# Patient Record
Sex: FEMALE | Race: WHITE | NOT HISPANIC OR LATINO | URBAN - METROPOLITAN AREA
[De-identification: names, ages, dates, MRNs, and addresses within clinical notes are randomized per-mention and may not be internally consistent; named-entity substitution may affect disease eponyms.]

---

## 2018-12-06 ENCOUNTER — HOSPITAL ENCOUNTER (EMERGENCY)
Facility: HOSPITAL | Age: 33
Discharge: PSYCHIATRIC OR SUBSTANCE USE FACILITY | End: 2018-12-06
Attending: EMERGENCY MEDICINE
Payer: COMMERCIAL

## 2018-12-06 VITALS
OXYGEN SATURATION: 99 % | BODY MASS INDEX: 53.81 KG/M2 | SYSTOLIC BLOOD PRESSURE: 132 MMHG | RESPIRATION RATE: 18 BRPM | WEIGHT: 285 LBS | DIASTOLIC BLOOD PRESSURE: 69 MMHG | HEIGHT: 61 IN | HEART RATE: 81 BPM | TEMPERATURE: 99 F

## 2018-12-06 DIAGNOSIS — T50.902A INTENTIONAL DRUG OVERDOSE, INITIAL ENCOUNTER: ICD-10-CM

## 2018-12-06 DIAGNOSIS — R45.851 SUICIDAL THOUGHTS: ICD-10-CM

## 2018-12-06 DIAGNOSIS — F32.2 SEVERE DEPRESSION: Primary | ICD-10-CM

## 2018-12-06 DIAGNOSIS — T50.901A OVERDOSE: ICD-10-CM

## 2018-12-06 PROBLEM — F32.A DEPRESSION: Status: ACTIVE | Noted: 2018-12-06

## 2018-12-06 LAB
ALBUMIN SERPL BCP-MCNC: 3.3 G/DL
ALP SERPL-CCNC: 59 U/L
ALT SERPL W/O P-5'-P-CCNC: 10 U/L
AMPHET+METHAMPHET UR QL: NORMAL
ANION GAP SERPL CALC-SCNC: 10 MMOL/L
AST SERPL-CCNC: 16 U/L
B-HCG UR QL: NEGATIVE
BACTERIA #/AREA URNS HPF: ABNORMAL /HPF
BARBITURATES UR QL SCN>200 NG/ML: NEGATIVE
BASOPHILS # BLD AUTO: 0.02 K/UL
BASOPHILS NFR BLD: 0.2 %
BENZODIAZ UR QL SCN>200 NG/ML: NEGATIVE
BILIRUB SERPL-MCNC: 0.3 MG/DL
BILIRUB UR QL STRIP: NEGATIVE
BUN SERPL-MCNC: 15 MG/DL
BZE UR QL SCN: NEGATIVE
CALCIUM SERPL-MCNC: 9.2 MG/DL
CANNABINOIDS UR QL SCN: NEGATIVE
CHLORIDE SERPL-SCNC: 106 MMOL/L
CLARITY UR: CLEAR
CO2 SERPL-SCNC: 21 MMOL/L
COLOR UR: YELLOW
CREAT SERPL-MCNC: 0.8 MG/DL
CREAT UR-MCNC: 152.9 MG/DL
DIFFERENTIAL METHOD: ABNORMAL
EOSINOPHIL # BLD AUTO: 0 K/UL
EOSINOPHIL NFR BLD: 0.3 %
ERYTHROCYTE [DISTWIDTH] IN BLOOD BY AUTOMATED COUNT: 12.2 %
EST. GFR  (AFRICAN AMERICAN): >60 ML/MIN/1.73 M^2
EST. GFR  (NON AFRICAN AMERICAN): >60 ML/MIN/1.73 M^2
ETHANOL SERPL-MCNC: <10 MG/DL
GLUCOSE SERPL-MCNC: 127 MG/DL
GLUCOSE UR QL STRIP: NEGATIVE
HCT VFR BLD AUTO: 35.4 %
HGB BLD-MCNC: 11.7 G/DL
HGB UR QL STRIP: ABNORMAL
INR PPP: 0.9
KETONES UR QL STRIP: NEGATIVE
LEUKOCYTE ESTERASE UR QL STRIP: NEGATIVE
LYMPHOCYTES # BLD AUTO: 1.8 K/UL
LYMPHOCYTES NFR BLD: 19.6 %
MCH RBC QN AUTO: 29.8 PG
MCHC RBC AUTO-ENTMCNC: 33.1 G/DL
MCV RBC AUTO: 90 FL
METHADONE UR QL SCN>300 NG/ML: NEGATIVE
MICROSCOPIC COMMENT: ABNORMAL
MONOCYTES # BLD AUTO: 0 K/UL
MONOCYTES NFR BLD: 0.2 %
NEUTROPHILS # BLD AUTO: 7.3 K/UL
NEUTROPHILS NFR BLD: 79.7 %
NITRITE UR QL STRIP: NEGATIVE
OPIATES UR QL SCN: NEGATIVE
PCP UR QL SCN>25 NG/ML: NEGATIVE
PH UR STRIP: 6 [PH] (ref 5–8)
PLATELET # BLD AUTO: 338 K/UL
PMV BLD AUTO: 10.8 FL
POTASSIUM SERPL-SCNC: 3.9 MMOL/L
PROT SERPL-MCNC: 7.2 G/DL
PROT UR QL STRIP: NEGATIVE
PROTHROMBIN TIME: 10.1 SEC
RBC # BLD AUTO: 3.93 M/UL
RBC #/AREA URNS HPF: 3 /HPF (ref 0–4)
SALICYLATES SERPL-MCNC: <5 MG/DL
SODIUM SERPL-SCNC: 137 MMOL/L
SP GR UR STRIP: 1.02 (ref 1–1.03)
SQUAMOUS #/AREA URNS HPF: 9 /HPF
TOXICOLOGY INFORMATION: NORMAL
URN SPEC COLLECT METH UR: ABNORMAL
UROBILINOGEN UR STRIP-ACNC: NEGATIVE EU/DL
WBC # BLD AUTO: 9.14 K/UL
WBC #/AREA URNS HPF: 2 /HPF (ref 0–5)

## 2018-12-06 PROCEDURE — 93005 ELECTROCARDIOGRAM TRACING: CPT

## 2018-12-06 PROCEDURE — 85025 COMPLETE CBC W/AUTO DIFF WBC: CPT

## 2018-12-06 PROCEDURE — 99201 PR OFFICE/OUTPT VISIT,NEW,LEVL I: CPT | Mod: GT,,, | Performed by: PSYCHIATRY & NEUROLOGY

## 2018-12-06 PROCEDURE — 80307 DRUG TEST PRSMV CHEM ANLYZR: CPT

## 2018-12-06 PROCEDURE — 81025 URINE PREGNANCY TEST: CPT

## 2018-12-06 PROCEDURE — 85610 PROTHROMBIN TIME: CPT

## 2018-12-06 PROCEDURE — 80053 COMPREHEN METABOLIC PANEL: CPT

## 2018-12-06 PROCEDURE — 93010 ELECTROCARDIOGRAM REPORT: CPT | Mod: ,,, | Performed by: INTERNAL MEDICINE

## 2018-12-06 PROCEDURE — 81000 URINALYSIS NONAUTO W/SCOPE: CPT

## 2018-12-06 PROCEDURE — 80320 DRUG SCREEN QUANTALCOHOLS: CPT

## 2018-12-06 PROCEDURE — 25000003 PHARM REV CODE 250: Performed by: EMERGENCY MEDICINE

## 2018-12-06 PROCEDURE — 99285 EMERGENCY DEPT VISIT HI MDM: CPT | Mod: 25

## 2018-12-06 RX ORDER — ONDANSETRON 4 MG/1
4 TABLET, ORALLY DISINTEGRATING ORAL
Status: COMPLETED | OUTPATIENT
Start: 2018-12-06 | End: 2018-12-06

## 2018-12-06 RX ORDER — ZOLPIDEM TARTRATE 5 MG/1
5 TABLET ORAL NIGHTLY PRN
Status: ON HOLD | COMMUNITY
Start: 2018-11-28 | End: 2018-12-10 | Stop reason: HOSPADM

## 2018-12-06 RX ORDER — FLUOXETINE HYDROCHLORIDE 40 MG/1
40 CAPSULE ORAL DAILY
Status: ON HOLD | COMMUNITY
Start: 2018-08-22 | End: 2018-12-10 | Stop reason: HOSPADM

## 2018-12-06 RX ORDER — LAMOTRIGINE 25 MG/1
TABLET ORAL
Status: ON HOLD | COMMUNITY
Start: 2018-08-22 | End: 2018-12-10 | Stop reason: HOSPADM

## 2018-12-06 RX ORDER — ETONOGESTREL AND ETHINYL ESTRADIOL VAGINAL RING .015; .12 MG/D; MG/D
RING VAGINAL
COMMUNITY
Start: 2018-08-04

## 2018-12-06 RX ORDER — TINIDAZOLE 500 MG/1
TABLET ORAL
Status: ON HOLD | COMMUNITY
Start: 2018-08-21 | End: 2018-12-10 | Stop reason: HOSPADM

## 2018-12-06 RX ORDER — DEXTROAMPHETAMINE SACCHARATE, AMPHETAMINE ASPARTATE, DEXTROAMPHETAMINE SULFATE AND AMPHETAMINE SULFATE 2.5; 2.5; 2.5; 2.5 MG/1; MG/1; MG/1; MG/1
10 TABLET ORAL
COMMUNITY
Start: 2018-08-22

## 2018-12-06 RX ORDER — HYDROXYZINE PAMOATE 25 MG/1
25 CAPSULE ORAL 3 TIMES DAILY PRN
COMMUNITY
Start: 2018-11-14

## 2018-12-06 RX ADMIN — ONDANSETRON 4 MG: 4 TABLET, ORALLY DISINTEGRATING ORAL at 06:12

## 2018-12-06 NOTE — ED PROVIDER NOTES
SCRIBE #1 NOTE: I, Corinne Mack, am scribing for, and in the presence of, Abelardo Lima Jr., MD. I have scribed the entire note.      History      Chief Complaint   Patient presents with    Ingestion     pt reports taking 6 or more Ambien last night       Review of patient's allergies indicates:   Allergen Reactions    Sulfa (sulfonamide antibiotics) Nausea And Vomiting     Severe headaches        HPI   HPI    12/6/2018, 9:47 AM   History obtained from the patient      History of Present Illness: Shantell Marcum is a 33 y.o. female patient who presents to the Emergency Department for ingestion which onset today. Pt reports feeling overwhelmed at home with her youngest child and working 12 hour shifts at work. Pt states her boyfriend does not help her much with taking care of their children and the home. Pt states she took 2-3 Lamictal, 40 mg Prozac, 5-6 (5 mg) Ambien, 2-3 vistaril, 2 Neurontin, and 2 unknown pills that appeared to be of the same type. Symptoms are constant and moderate in severity. No mitigating or exacerbating factors reported. No associated sxs reported. Patient denies any fever, chills, CP, SOB, N/V/D, back pain, neck pain, HA, dizziness, HI, hallucinations, and all other sxs at this time. No prior Tx reported. No further complaints or concerns at this time.         Arrival mode: EMS    PCP: No primary care provider on file.       Past Medical History:  History reviewed. No pertinent past medical history.    Past Surgical History:  History reviewed. No pertinent surgical history.      Family History:  History reviewed. No pertinent family history.    Social History:  Social History     Tobacco Use    Smoking status: Never Smoker    Smokeless tobacco: Never Used   Substance and Sexual Activity    Alcohol use: Yes     Comment: socially    Drug use: Yes     Types: Other-see comments     Comment: pt reports obtaining stimulant medication from friend when she runs out of her prescribed Vyvanse     Sexual activity: Unknown       ROS   Review of Systems   Constitutional: Negative for chills and fever.        (+) intentional drug OD   Respiratory: Negative for cough and shortness of breath.    Cardiovascular: Negative for chest pain and leg swelling.   Gastrointestinal: Negative for abdominal pain, diarrhea, nausea and vomiting.   Musculoskeletal: Negative for back pain, neck pain and neck stiffness.   Skin: Negative for rash and wound.   Neurological: Negative for dizziness, light-headedness, numbness and headaches.   Psychiatric/Behavioral: Positive for suicidal ideas. Negative for hallucinations and self-injury. The patient is not nervous/anxious.         (-) HI   All other systems reviewed and are negative.    Physical Exam      Initial Vitals [12/06/18 0940]   BP Pulse Resp Temp SpO2   (!) 145/92 100 18 98.5 °F (36.9 °C) 98 %      MAP       --          Physical Exam  Nursing Notes and Vital Signs Reviewed.  Constitutional: Patient is in no acute distress. Well-developed and well-nourished.  Head: Atraumatic. Normocephalic.  Eyes: PERRL. EOM intact. Conjunctivae are not pale. No scleral icterus.  ENT: Mucous membranes are moist. Oropharynx is clear and symmetric.    Neck: Supple. Full ROM. No lymphadenopathy.  Cardiovascular: Regular rate. Regular rhythm. No murmurs, rubs, or gallops. Distal pulses are 2+ and symmetric.  Pulmonary/Chest: No respiratory distress. Clear to auscultation bilaterally. No wheezing or rales.  Abdominal: Soft and non-distended.  There is no tenderness.  No rebound, guarding, or rigidity.   Musculoskeletal: Moves all extremities. No obvious deformities.  Skin: Warm and dry.  Neurological:  Alert, awake, and appropriate.  Normal speech.  No acute focal neurological deficits are appreciated.  Psychiatric:               Behavior: cooperative, tearful              Mood and Affect: depressed affect              Thought Process: within normal limits              Suicidal Ideations:  "Yes              Suicidal Plan: Specific plan to harm self.  Multi-drug OD.              Homicidal Ideations: No              Hallucinations: none      ED Course    Procedures  ED Vital Signs:  Vitals:    12/06/18 0940 12/06/18 1015 12/06/18 1046   BP: (!) 145/92  104/62   Pulse: 100 98 91   Resp: 18  16   Temp: 98.5 °F (36.9 °C)     TempSrc: Oral     SpO2: 98%  99%   Weight: 129.3 kg (285 lb)     Height: 5' 1" (1.549 m)         Abnormal Lab Results:  Labs Reviewed   CBC W/ AUTO DIFFERENTIAL - Abnormal; Notable for the following components:       Result Value    RBC 3.93 (*)     Hemoglobin 11.7 (*)     Hematocrit 35.4 (*)     Mono # 0.0 (*)     Gran% 79.7 (*)     Mono% 0.2 (*)     All other components within normal limits   COMPREHENSIVE METABOLIC PANEL - Abnormal; Notable for the following components:    CO2 21 (*)     Glucose 127 (*)     Albumin 3.3 (*)     All other components within normal limits   URINALYSIS - Abnormal; Notable for the following components:    Occult Blood UA 2+ (*)     All other components within normal limits   SALICYLATE LEVEL - Abnormal; Notable for the following components:    Salicylate Lvl <5.0 (*)     All other components within normal limits   URINALYSIS MICROSCOPIC - Abnormal; Notable for the following components:    Bacteria, UA Few (*)     All other components within normal limits   DRUG SCREEN PANEL, URINE EMERGENCY   ALCOHOL,MEDICAL (ETHANOL)   PROTIME-INR        All Lab Results:  Results for orders placed or performed during the hospital encounter of 12/06/18   CBC auto differential   Result Value Ref Range    WBC 9.14 3.90 - 12.70 K/uL    RBC 3.93 (L) 4.00 - 5.40 M/uL    Hemoglobin 11.7 (L) 12.0 - 16.0 g/dL    Hematocrit 35.4 (L) 37.0 - 48.5 %    MCV 90 82 - 98 fL    MCH 29.8 27.0 - 31.0 pg    MCHC 33.1 32.0 - 36.0 g/dL    RDW 12.2 11.5 - 14.5 %    Platelets 338 150 - 350 K/uL    MPV 10.8 9.2 - 12.9 fL    Gran # (ANC) 7.3 1.8 - 7.7 K/uL    Lymph # 1.8 1.0 - 4.8 K/uL    Mono # " 0.0 (L) 0.3 - 1.0 K/uL    Eos # 0.0 0.0 - 0.5 K/uL    Baso # 0.02 0.00 - 0.20 K/uL    Gran% 79.7 (H) 38.0 - 73.0 %    Lymph% 19.6 18.0 - 48.0 %    Mono% 0.2 (L) 4.0 - 15.0 %    Eosinophil% 0.3 0.0 - 8.0 %    Basophil% 0.2 0.0 - 1.9 %    Differential Method Automated    Comprehensive metabolic panel   Result Value Ref Range    Sodium 137 136 - 145 mmol/L    Potassium 3.9 3.5 - 5.1 mmol/L    Chloride 106 95 - 110 mmol/L    CO2 21 (L) 23 - 29 mmol/L    Glucose 127 (H) 70 - 110 mg/dL    BUN, Bld 15 6 - 20 mg/dL    Creatinine 0.8 0.5 - 1.4 mg/dL    Calcium 9.2 8.7 - 10.5 mg/dL    Total Protein 7.2 6.0 - 8.4 g/dL    Albumin 3.3 (L) 3.5 - 5.2 g/dL    Total Bilirubin 0.3 0.1 - 1.0 mg/dL    Alkaline Phosphatase 59 55 - 135 U/L    AST 16 10 - 40 U/L    ALT 10 10 - 44 U/L    Anion Gap 10 8 - 16 mmol/L    eGFR if African American >60 >60 mL/min/1.73 m^2    eGFR if non African American >60 >60 mL/min/1.73 m^2   Urinalysis   Result Value Ref Range    Specimen UA Urine, Clean Catch     Color, UA Yellow Yellow, Straw, Juani    Appearance, UA Clear Clear    pH, UA 6.0 5.0 - 8.0    Specific Gravity, UA 1.020 1.005 - 1.030    Protein, UA Negative Negative    Glucose, UA Negative Negative    Ketones, UA Negative Negative    Bilirubin (UA) Negative Negative    Occult Blood UA 2+ (A) Negative    Nitrite, UA Negative Negative    Urobilinogen, UA Negative <2.0 EU/dL    Leukocytes, UA Negative Negative   Drug screen panel, emergency   Result Value Ref Range    Benzodiazepines Negative     Methadone metabolites Negative     Cocaine (Metab.) Negative     Opiate Scrn, Ur Negative     Barbiturate Screen, Ur Negative     Amphetamine Screen, Ur Presumptive Positive     THC Negative     Phencyclidine Negative     Creatinine, Random Ur 152.9 15.0 - 325.0 mg/dL    Toxicology Information SEE COMMENT    Ethanol   Result Value Ref Range    Alcohol, Medical, Serum <10 <10 mg/dL   Salicylate level   Result Value Ref Range    Salicylate Lvl <5.0 (L) 15.0  - 30.0 mg/dL   Protime-INR   Result Value Ref Range    Prothrombin Time 10.1 9.0 - 12.5 sec    INR 0.9 0.8 - 1.2   Urinalysis Microscopic   Result Value Ref Range    RBC, UA 3 0 - 4 /hpf    WBC, UA 2 0 - 5 /hpf    Bacteria, UA Few (A) None-Occ /hpf    Squam Epithel, UA 9 /hpf    Microscopic Comment SEE COMMENT        Imaging Results:  Imaging Results    None          The EKG was ordered, reviewed, and independently interpreted by the ED provider.  Interpretation time: 1008  Rate: 94 BPM  Rhythm: normal sinus rhythm  Interpretation: Possible L atrial enlargement. No STEMI.             The Emergency Provider reviewed the vital signs and test results, which are outlined above.    ED Discussion     9:55 AM: The PEC hold has been issued by Dr. Lima at this time for SI.    9:59 AM: Poison Control recommends getting a baseline EKG and watching the pt for a few hours. If the pt's status does not change she can medically cleared.    11:15 AM: Pt has been medically cleared by Dr. Lima at this time. Reassessed pt at this time. Pt is resting comfortably and appears in no acute distress. There are no psychiatric services offered at this facility. D/w pt all pertinent ED information and plan to transfer to psychiatric facility for psychiatric treatment. Pt verbalizes understanding. Patient being transferred by [facility] for ongoing personal protection en route. Pt will be transported by personnel trained in CPR and CPI. All questions and complaints have been addressed at this time. Pt condition is stable at this time and is clear to transfer to psychiatric facility at this time.       ED Medication(s):  Medications - No data to display       Medication List      ASK your doctor about these medications    ADDERALL 10 mg Tab  Generic drug:  dextroamphetamine-amphetamine     AMBIEN 5 MG Tab  Generic drug:  zolpidem     FLUoxetine 40 MG capsule     hydrOXYzine pamoate 25 MG Cap  Commonly known as:  VISTARIL     lamoTRIgine 25 MG  tablet  Commonly known as:  LAMICTAL     NUVARING 0.12-0.015 mg/24 hr vaginal ring  Generic drug:  etonogestrel-ethinyl estradiol     tinidazole 500 MG tablet  Commonly known as:  TINDAMAX                  Medical Decision Making    Medical Decision Making:   Clinical Tests:   Lab Tests: Ordered and Reviewed  Medical Tests: Ordered and Reviewed           Scribe Attestation:   Scribe #1: I performed the above scribed service and the documentation accurately describes the services I performed. I attest to the accuracy of the note 12/06/2018 11:16 AM.    Attending:   Physician Attestation Statement for Scribe #1: I, Abelardo Lima Jr., MD, personally performed the services described in this documentation, as scribed by Corinne Mack, in my presence, and it is both accurate and complete.          Clinical Impression       ICD-10-CM ICD-9-CM   1. Severe depression F32.2 311   2. Overdose T50.901A 977.9     E980.5   3. Intentional drug overdose, initial encounter T50.902A 977.9     E950.5   4. Suicidal thoughts R45.851 V62.84       Disposition:   Disposition: Transferred  Condition: Stable           Abelardo Lima Jr., MD  12/06/18 8226

## 2018-12-06 NOTE — ED NOTES
Pt resting in bed, no acute distress noted. Bed in low and locked position. Room secured per PEC protocol. Pt in grey gown and yellow socks. Pt being directly monitored by hernando Jacobo at this time. Will continue to monitor.

## 2018-12-06 NOTE — ED NOTES
Pt resting in bed, no acute distress noted. Bed in low and locked position. Pt's belongings secured and pt in grey gown and yellow socks. Pt being directly monitored by hernando Perez at this time. Will continue to monitor.

## 2018-12-06 NOTE — ED NOTES
CPT placement accepted by Renuka at [facility] at [address], [place] for the service of  . Please call report in 20 minutes to [phone] .

## 2018-12-06 NOTE — ED NOTES
PT MEDICALLY CLEARED, OK TO REMOVE CARDIAC MONITOR & MOVE PATIENT TO BACK. SITTER REMAINS AT BEDSIDE. WCTM.

## 2018-12-06 NOTE — ED NOTES
Belongings collected and secured in locker 29 at this time:    Angry birds pants  Pair of socks  Green Cane's sweater  Moraes Shorts  Blue sweater  Purple tshirt  Black bra  Black purse: lotions, lip balm, pads, candy, work ID, nail clippers, plastic nail file, wallelt pink with lux credit card, mastercard, BC[employer] and [employer] and [employer] medical cards, headphones, hairbrush, ID, and Womens medical card  Medication bottles: Adderal, Vyvanse (parisa Jossue prescribed), lamictal, ambien, Prozac (2bottles), hydroxyzine, ambien (2 bottles), bottle with no label with pink pills in it .

## 2018-12-06 NOTE — ED NOTES
Pt reports ingesting 2-3 (25mg) lamictal, 1 (40mg) tablet prozac, 5-6 (5mg) ambien, 2-3 (25mg) vistaril, 2 (unknown mg) neurontin, 2 unknown pills.  Case discussed with Poison Control, spoke to JORDYN Kaufman.  States to obtain baseline EKG and treat symptomatically.  Dr. Lima notified.

## 2018-12-06 NOTE — ED NOTES
Pt ok for RN to talk to boyfriend Chris about care but does not want information provided to her mother.

## 2018-12-06 NOTE — ED NOTES
Dr. Lima said patient would be medically cleared after patient is more alert and easily arouses to voice. Will continue to monitor.

## 2018-12-06 NOTE — CONSULTS
"Tele-Consultation to Emergency Department from Psychiatry    Please see previous notes:    From current presentation:  Shantell Marcum is a 33 y.o. female patient who presents to the Emergency Department for ingestion which onset today. Pt reports feeling overwhelmed at home with her youngest child and working 12 hour shifts at work. Pt states her boyfriend does not help her much with taking care of their children and the home. Pt states she took 2-3 Lamictal, 40 mg Prozac, 5-6 (5 mg) Ambien, 2-3 vistaril, 2 Neurontin, and 2 unknown pills that appeared to be of the same type.    Patient agreeable to consultation via telepsychiatry.    Consultation started: 12/6/2018 at 11:25 am.  The chief complaint leading to psychiatric consultation is: overdose  This consultation was requested by Dr. Abelardo Lima, the Emergency Department attending physician.  The location of the consulting psychiatrist is [address] [place] [address].  The patient location is [facility].    Patient Identification:  Shantell Marcum is a 33 y.o. female.    Patient information was obtained from patient.    History of Present Illness:  Pt. Drowsy, falling asleep, only able to give minimal information.  "I don't really remember", does not know if she wanted to kill herself.  Takes Vistaril, Prozac.    Pt. Does not want me to call any source of collateral info.    Review of Systems:  Denies any current physical complaint.    Past Medical History: History reviewed. No pertinent past medical history.     Allergies:   Review of patient's allergies indicates:   Allergen Reactions    Sulfa (sulfonamide antibiotics) Nausea And Vomiting     Severe headaches     Medications in ER: Medications - No data to display    Substance Abuse History:   Alchohol: varying amounts  Drug: denies     Current Evaluation:     Constitutional  Vitals:  Vitals:    12/06/18 0940 12/06/18 1015 12/06/18 1046   BP: (!) 145/92  104/62   Pulse: 100 98 91   Resp: " "18  16   Temp: 98.5 °F (36.9 °C)     TempSrc: Oral     SpO2: 98%  99%   Weight: 129.3 kg (285 lb)     Height: 5' 1" (1.549 m)        General:  unremarkable, age appropriate     Musculoskeletal  Muscle Strength/Tone:   moving arms normally   Gait & Station:   sitting on stretcher     Psychiatric  Level of Consciousness: falling asleep  Psychomotor Behavior: no agitation  Speech: normal in rate, rhythm and volume  Language: uses words appropriately  Affect: constricted  Thought Content: possible suicide attempt today, denies HI  Insight: appears limited  Judgement: appears limited    Relevant Elements of Neurological Exam: no abnormality of posture noted    Assessment - Diagnosis - Goals:     Diagnosis/Impression:   Overdose  Urine tox positive for amphetamine    Case d/w ER MD Dr. Lima    Rec:  - please have  ascertain safety of pt.'s children  - medical clearance  - PEC  - if medically cleared, psychiatric hospitalization  - no standing psychotropic medication for now  - obtain more history from patient and others  - Zyprexa 5 mg p.o./i.m. q8h prn for agitation    Time with patient: 5 min    More than 50% of the time was spent counseling/coordinating care    Laboratory Data:   Labs Reviewed   CBC W/ AUTO DIFFERENTIAL - Abnormal; Notable for the following components:       Result Value    RBC 3.93 (*)     Hemoglobin 11.7 (*)     Hematocrit 35.4 (*)     Mono # 0.0 (*)     Gran% 79.7 (*)     Mono% 0.2 (*)     All other components within normal limits   COMPREHENSIVE METABOLIC PANEL - Abnormal; Notable for the following components:    CO2 21 (*)     Glucose 127 (*)     Albumin 3.3 (*)     All other components within normal limits   URINALYSIS - Abnormal; Notable for the following components:    Occult Blood UA 2+ (*)     All other components within normal limits   SALICYLATE LEVEL - Abnormal; Notable for the following components:    Salicylate Lvl <5.0 (*)     All other components within normal limits "   URINALYSIS MICROSCOPIC - Abnormal; Notable for the following components:    Bacteria, UA Few (*)     All other components within normal limits   DRUG SCREEN PANEL, URINE EMERGENCY   ALCOHOL,MEDICAL (ETHANOL)   PROTIME-INR

## 2018-12-06 NOTE — ED NOTES
Dr. Lima discussed PEC process and rights with pt. PEC Rights packet left at bs. Pt signed Acknowledgment of Notification of Rights, RN witness. ROMELIA Navarrete at bedside as sitter.

## 2018-12-06 NOTE — ED TRIAGE NOTES
Zolpidem 5mg (30 tablets) dispense date 12/5/18, 21 pills remaining.  Dr. Lima notified, states to contact poison control.

## 2018-12-07 PROBLEM — F41.1 GAD (GENERALIZED ANXIETY DISORDER): Status: ACTIVE | Noted: 2018-12-07

## 2018-12-07 PROBLEM — F33.2 SEVERE EPISODE OF RECURRENT MAJOR DEPRESSIVE DISORDER, WITHOUT PSYCHOTIC FEATURES: Status: ACTIVE | Noted: 2018-12-07

## 2018-12-07 PROBLEM — F43.23 ADJUSTMENT DISORDER WITH MIXED ANXIETY AND DEPRESSED MOOD: Status: ACTIVE | Noted: 2018-12-07
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
## 2018-12-07 NOTE — ED NOTES
Patient pushed to [employer] care via wheelchair per myself, 2 [employer] drivers EBRSO and officer security walking along side patient. Patient stood and got into the back seat of the car at this time.